# Patient Record
(demographics unavailable — no encounter records)

---

## 2025-01-02 NOTE — HISTORY OF PRESENT ILLNESS
[FreeTextEntry1] : 60 yo male with h/o HLD initially seen for evaluation for chest pressure in Nov 2023, lost to follow up and returned with persistent symptoms in 08/2024. He underwent CCTA which revealed severe LAD stenosis and subsequently a cath/PCI to the mid LAD. in 09/2024. He returns for follow up today. Denies any further chest pain. Reports dyspnea with strenuous activities. He is undergoing cardiac rehab. He is not back to his usual activity level as before.  He denies any palpitations, dyspnea LE edema, orthopnea or PND.  Continues to have erectile dysfunction - notes that it have been present since his cardiac catheterization; feels that it could be procedure related.  Cardiac Workup: EKG NSR without ischemic changes CCTA: CAC score 271, mod-sev stenosis of mid LAD (FFR 0.79 distal), mod prox LAD and OM2 stenosis (CT-FFR neg), mild-mod OM1 stenosis (CT FFR neg) TTE: normal LV size/function. LVEF 61%, normal RV, no significant valvular disease. Cardiac cath 9/20/24: One vessel CAD s/p PTCA and DESx1 of the mid LAD with excellent angiographic and IVUS result

## 2025-01-02 NOTE — ASSESSMENT
[FreeTextEntry1] : 60 yo male with h/o CAD s/p PCI and HLD here for follow up cardiac care.  #CAD s/p PCI of the mid LAD in 09/2024 --continue DAPT with ASA and brilinta; tolerating --continue cardiac rehab --will hold off beta blocker given concern for ED --continue statin, crestor 40mg daily --continued CV risk factor modification  # Hyperlipidemia -  Sub-optimally Controlled, Aim for a LDL of <70 - continue crestor 20mg; reluctant to increase dose to 40mg, recent LDL 77 --wants to attempt better diet and regular exercise. Will recheck lipids in 3 months and reassess for up-titration vs. zetia - Low fat low cholesterol diet - Heart healthy diet emphasized - increased exercise as tolerated  #ED -- reassured it was not procedure related --strong correlation between ED and CAD explained, could be 2/2 underlying PAD --rec  and PMD follow up and evaluation --might benefit from PDE-5Is  --will avoid nitrates and beta blockers for now  Follow up in 6 months

## 2025-06-09 NOTE — HISTORY OF PRESENT ILLNESS
[FreeTextEntry1] : 62 yo male with h/o HLD initially seen for evaluation for chest pressure in Nov 2023, lost to follow up and returned with persistent symptoms in 08/2024. He underwent CCTA which revealed severe LAD stenosis and subsequently a cath/PCI to the mid LAD. in 09/2024.  He returns for follow up today. Denies any further chest pain. s/p cardiac rehab Trying to exercise regularly. Gets his HR up to 130s when he does. Feels overall improved. He denies any palpitations, dyspnea, LE edema, orthopnea or PND.  Continues to have erectile dysfunction since his cath and diagnosis of CAD.   Cardiac Workup: EKG NSR at 84bpm, without ischemic changes CCTA: CAC score 271, mod-sev stenosis of mid LAD (FFR 0.79 distal), mod prox LAD and OM2 stenosis (CT-FFR neg), mild-mod OM1 stenosis (CT FFR neg) TTE: normal LV size/function. LVEF 61%, normal RV, no significant valvular disease. Cardiac cath 9/20/24: One vessel CAD s/p PTCA and DESx1 of the mid LAD with excellent angiographic and IVUS result

## 2025-06-09 NOTE — ASSESSMENT
[FreeTextEntry1] : 62 yo male with h/o CAD s/p PCI and HLD here for follow up cardiac care.  #CAD s/p PCI of the mid LAD in 09/2024, CCS II --continue DAPT with ASA and brilinta; tolerating. Will switch to SAPT next visit --continue cardiac rehab --will hold off beta blocker given concern for ED --continue statin, crestor 20mg daily --continued CV risk factor modification  # Hyperlipidemia -  Well controlled, Goal LDL <70 - continue statin --continued diet and regular exercise - Low fat low cholesterol, Mediterranean diet - Heart healthy diet emphasized - increased exercise as tolerated  #ED -- reassured it was not procedure related --strong correlation between ED and CAD explained, could be 2/2 underlying PAD --rec  and PMD follow up and evaluation --might benefit from PDE-5Is  --will avoid nitrates and beta blockers for now  Follow up in 6 months